# Patient Record
Sex: FEMALE | Race: WHITE | URBAN - METROPOLITAN AREA
[De-identification: names, ages, dates, MRNs, and addresses within clinical notes are randomized per-mention and may not be internally consistent; named-entity substitution may affect disease eponyms.]

---

## 2023-11-09 ENCOUNTER — OFFICE VISIT (OUTPATIENT)
Age: 53
End: 2023-11-09

## 2023-11-09 VITALS
HEART RATE: 81 BPM | DIASTOLIC BLOOD PRESSURE: 79 MMHG | BODY MASS INDEX: 31.65 KG/M2 | SYSTOLIC BLOOD PRESSURE: 120 MMHG | TEMPERATURE: 98.5 F | HEIGHT: 65 IN | RESPIRATION RATE: 17 BRPM | WEIGHT: 190 LBS | OXYGEN SATURATION: 95 %

## 2023-11-09 DIAGNOSIS — R50.9 FEVER, UNSPECIFIED FEVER CAUSE: Primary | ICD-10-CM

## 2023-11-09 DIAGNOSIS — U07.1 COVID-19: ICD-10-CM

## 2023-11-09 LAB
Lab: ABNORMAL
QC PASS/FAIL: ABNORMAL
SARS-COV-2 RDRP RESP QL NAA+PROBE: POSITIVE

## 2023-11-09 RX ORDER — METHYLPREDNISOLONE 4 MG/1
TABLET ORAL
Qty: 21 TABLET | Refills: 0 | Status: SHIPPED | OUTPATIENT
Start: 2023-11-09 | End: 2023-11-15

## 2023-11-09 RX ORDER — AZITHROMYCIN 250 MG/1
250 TABLET, FILM COATED ORAL SEE ADMIN INSTRUCTIONS
Qty: 6 TABLET | Refills: 0 | Status: SHIPPED | OUTPATIENT
Start: 2023-11-09 | End: 2023-11-14

## 2024-02-16 ENCOUNTER — OFFICE VISIT (OUTPATIENT)
Age: 54
End: 2024-02-16

## 2024-02-16 VITALS
DIASTOLIC BLOOD PRESSURE: 80 MMHG | OXYGEN SATURATION: 94 % | RESPIRATION RATE: 16 BRPM | HEART RATE: 98 BPM | HEIGHT: 65 IN | TEMPERATURE: 97.7 F | SYSTOLIC BLOOD PRESSURE: 123 MMHG | WEIGHT: 211 LBS | BODY MASS INDEX: 35.16 KG/M2

## 2024-02-16 DIAGNOSIS — J20.9 ACUTE BRONCHITIS, UNSPECIFIED ORGANISM: Primary | ICD-10-CM

## 2024-02-16 RX ORDER — ALBUTEROL SULFATE 90 UG/1
2 AEROSOL, METERED RESPIRATORY (INHALATION) 4 TIMES DAILY PRN
Qty: 18 G | Refills: 0 | Status: SHIPPED | OUTPATIENT
Start: 2024-02-16

## 2024-02-16 RX ORDER — DEXTROMETHORPHAN HYDROBROMIDE AND PROMETHAZINE HYDROCHLORIDE 15; 6.25 MG/5ML; MG/5ML
5 SYRUP ORAL 4 TIMES DAILY PRN
Qty: 120 ML | Refills: 0 | Status: SHIPPED | OUTPATIENT
Start: 2024-02-16 | End: 2024-02-23

## 2024-02-16 RX ORDER — AZITHROMYCIN 250 MG/1
250 TABLET, FILM COATED ORAL SEE ADMIN INSTRUCTIONS
Qty: 6 TABLET | Refills: 0 | Status: SHIPPED | OUTPATIENT
Start: 2024-02-16 | End: 2024-02-21

## 2024-02-16 RX ORDER — PREDNISONE 20 MG/1
20 TABLET ORAL 2 TIMES DAILY
Qty: 10 TABLET | Refills: 0 | Status: SHIPPED | OUTPATIENT
Start: 2024-02-16 | End: 2024-02-21

## 2024-02-16 NOTE — PROGRESS NOTES
Catalina SWANN [facility] (:  1970) is a 53 y.o. female,Established patient, here for evaluation of the following chief complaint(s):  Cough (Cough / wheezing / headache / 4 days )      ASSESSMENT/PLAN:  1. Acute bronchitis, unspecified organism    - azithromycin (ZITHROMAX) 250 MG tablet; Take 1 tablet by mouth See Admin Instructions for 5 days 500mg on day 1 followed by 250mg on days 2 - 5  Dispense: 6 tablet; Refill: 0  - predniSONE (DELTASONE) 20 MG tablet; Take 1 tablet by mouth 2 times daily for 5 days  Dispense: 10 tablet; Refill: 0  - promethazine-dextromethorphan (PROMETHAZINE-DM) 6.25-15 MG/5ML syrup; Take 5 mLs by mouth 4 times daily as needed for Cough  Dispense: 120 mL; Refill: 0  - albuterol sulfate HFA (VENTOLIN HFA) 108 (90 Base) MCG/ACT inhaler; Inhale 2 puffs into the lungs 4 times daily as needed for Wheezing  Dispense: 18 g; Refill: 0     -stop smoking,increase fluid intake,to the ER if worsening symptoms  No follow-ups on file.    SUBJECTIVE/OBJECTIVE:    History provided by:  Patient  Cough  The current episode started in the past 7 days. The problem has been gradually worsening. The cough is Productive of sputum. Associated symptoms include nasal congestion and wheezing. Pertinent negatives include no chest pain, chills, ear pain, fever, headaches, myalgias, rash, rhinorrhea, sore throat, shortness of breath or sweats.       Vitals:    24 1611   BP: 123/80   Site: Right Upper Arm   Position: Sitting   Cuff Size: Large Adult   Pulse: 98   Resp: 16   Temp: 97.7 °F (36.5 °C)   TempSrc: Oral   SpO2: 94%   Weight: 95.7 kg (211 lb)   Height: 1.651 m (5' 5\")       Review of Systems   Constitutional:  Negative for chills and fever.   HENT:  Negative for ear pain, rhinorrhea and sore throat.    Respiratory:  Positive for cough and wheezing. Negative for shortness of breath.    Cardiovascular:  Negative for chest pain.   Musculoskeletal:  Negative for myalgias.   Skin:  Negative for rash.

## 2024-08-06 ENCOUNTER — OFFICE VISIT (OUTPATIENT)
Age: 54
End: 2024-08-06

## 2024-08-06 VITALS
HEART RATE: 98 BPM | TEMPERATURE: 98.6 F | HEIGHT: 65 IN | SYSTOLIC BLOOD PRESSURE: 119 MMHG | BODY MASS INDEX: 36.65 KG/M2 | DIASTOLIC BLOOD PRESSURE: 78 MMHG | OXYGEN SATURATION: 96 % | WEIGHT: 220 LBS

## 2024-08-06 DIAGNOSIS — J01.10 ACUTE NON-RECURRENT FRONTAL SINUSITIS: Primary | ICD-10-CM

## 2024-08-06 DIAGNOSIS — J02.9 SORE THROAT: ICD-10-CM

## 2024-08-06 DIAGNOSIS — Z91.89 AT INCREASED RISK OF EXPOSURE TO COVID-19 VIRUS: ICD-10-CM

## 2024-08-06 LAB
Lab: NORMAL
PERFORMING INSTRUMENT: NORMAL
QC PASS/FAIL: NORMAL
SARS-COV-2, POC: NORMAL
STREPTOCOCCUS A RNA: NEGATIVE

## 2024-08-06 RX ORDER — AMOXICILLIN AND CLAVULANATE POTASSIUM 875; 125 MG/1; MG/1
1 TABLET, FILM COATED ORAL 2 TIMES DAILY
Qty: 14 TABLET | Refills: 0 | Status: SHIPPED | OUTPATIENT
Start: 2024-08-06 | End: 2024-08-06

## 2024-08-06 RX ORDER — PREDNISONE 20 MG/1
20 TABLET ORAL 2 TIMES DAILY
Qty: 10 TABLET | Refills: 0 | Status: SHIPPED | OUTPATIENT
Start: 2024-08-06 | End: 2024-08-11

## 2024-08-06 RX ORDER — AZITHROMYCIN 500 MG/1
500 TABLET, FILM COATED ORAL DAILY
Qty: 5 TABLET | Refills: 0 | Status: SHIPPED | OUTPATIENT
Start: 2024-08-06 | End: 2024-08-11

## 2024-08-06 ASSESSMENT — ENCOUNTER SYMPTOMS
TROUBLE SWALLOWING: 0
EYE DISCHARGE: 0
CHEST TIGHTNESS: 0
NAUSEA: 1
SHORTNESS OF BREATH: 0
VOMITING: 0
SINUS PRESSURE: 1
COUGH: 1
RHINORRHEA: 1
WHEEZING: 0
DIARRHEA: 0
SINUS PAIN: 1
SORE THROAT: 1

## 2024-08-06 NOTE — PATIENT INSTRUCTIONS
Take medications as prescribed    Increase hydration and rest    Saline spray or Flonase nasal spray to help relief nasal congestion    Sleep with head of bed elevated to help you breathe better    Avoid exposure to cigarette smoke or fumes    Avoid caffeine and alcohol    May use ibuprofen for headache, fever or discomfort

## 2024-08-06 NOTE — PROGRESS NOTES
1832 pm: Pt called clinic that Augmentin that she was put on today is making her nausea requesting alternative treatment.  Sent azithromycin to pharmacy on file. Unable to reach pt to make aware of changes. Home number incorrect. Cell phone number on chart not accepting messages. Pt needs to stop Augmentin and start azithromycin.       [facility] URGENT CAR[facility] ([place])  [facility] URGENT CARE  [address] [address]  Dept: [phone]  Dept Fax: [phone]  Loc: [phone]  Catalina Holland is a 54 y.o. female who presents today for her medical conditions/complaintsas noted below.  Catalina Holland is c/o of Pharyngitis (Sore throat, body aches, ear pain x1 day)      HPI:     HPI    Past Medical History:   Diagnosis Date    Anxiety     Depression       Past Surgical History:   Procedure Laterality Date    GALLBLADDER SURGERY      HYSTERECTOMY (CERVIX STATUS UNKNOWN)      TONSILLECTOMY      ADENOIDECTOMY       History reviewed. No pertinent family history.    Social History     Tobacco Use    Smoking status: Every Day     Current packs/day: 1.00     Types: Cigarettes    Smokeless tobacco: Not on file   Substance Use Topics    Alcohol use: No     Comment: SOCIAL      Current Outpatient Medications   Medication Sig Dispense Refill    predniSONE (DELTASONE) 20 MG tablet Take 1 tablet by mouth 2 times daily for 5 days 10 tablet 0    Pseudoephedrine-DM-GG 60- MG TABS Take 1 tablet by mouth every 6-8 hours as needed (Congestion/Sinusitis/cough) 30 tablet 0    azithromycin (ZITHROMAX) 500 MG tablet Take 1 tablet by mouth daily for 5 days 5 tablet 0    albuterol sulfate HFA (VENTOLIN HFA) 108 (90 Base) MCG/ACT inhaler Inhale 2 puffs into the lungs 4 times daily as needed for Wheezing 18 g 0    guaiFENesin (Q-TUSSIN) 100 MG/5ML syrup Take 200 mg by mouth every 8 hours as needed for Cough. (Patient not taking: Reported on 11/9/2023)      albuterol (PROAIR HFA) 108 (90 BASE) MCG/ACT inhaler 1-2

## 2024-08-06 NOTE — PROGRESS NOTES
[facility] URGENT CARE, [facility] ([place])  [facility] URGENT CARE  [address] [address]  Dept: [phone]  Dept Fax: [phone]  Loc: [phone]  Catalina Holland is a 54 y.o. female who presents today for her medical conditions/complaintsas noted below.  Catalina Holland is c/o of Pharyngitis (Sore throat, body aches, ear pain x1 day)      HPI:     HPI    Past Medical History:   Diagnosis Date    Anxiety     Depression       Past Surgical History:   Procedure Laterality Date    GALLBLADDER SURGERY      HYSTERECTOMY (CERVIX STATUS UNKNOWN)      TONSILLECTOMY      ADENOIDECTOMY       History reviewed. No pertinent family history.    Social History     Tobacco Use    Smoking status: Every Day     Current packs/day: 1.00     Types: Cigarettes    Smokeless tobacco: Not on file   Substance Use Topics    Alcohol use: No     Comment: SOCIAL      Current Outpatient Medications   Medication Sig Dispense Refill    predniSONE (DELTASONE) 20 MG tablet Take 1 tablet by mouth 2 times daily for 5 days 10 tablet 0    Pseudoephedrine-DM-GG 60- MG TABS Take 1 tablet by mouth every 6-8 hours as needed (Congestion/Sinusitis/cough) 30 tablet 0    azithromycin (ZITHROMAX) 500 MG tablet Take 1 tablet by mouth daily for 5 days 5 tablet 0    albuterol sulfate HFA (VENTOLIN HFA) 108 (90 Base) MCG/ACT inhaler Inhale 2 puffs into the lungs 4 times daily as needed for Wheezing 18 g 0    guaiFENesin (Q-TUSSIN) 100 MG/5ML syrup Take 200 mg by mouth every 8 hours as needed for Cough. (Patient not taking: Reported on 11/9/2023)      albuterol (PROAIR HFA) 108 (90 BASE) MCG/ACT inhaler 1-2 inhalations every 6 hours as needed for wheezing  Dispense with SPACER and Instruct on use (Patient not taking: Reported on 2/16/2024) 1 Inhaler 0     No current facility-administered medications for this visit.     Allergies   Allergen Reactions    Sulfa Antibiotics Shortness Of Breath    Chantix [Varenicline] Hallucinations    Hm

## 2024-08-06 NOTE — PROGRESS NOTES
[facility] URGENT CARE, [facility] ([place])  [facility] URGENT CARE  [address] [address]  Dept: [phone]  Dept Fax: [phone]  Loc: [phone]  Catalina Holland is a 54 y.o. female who presents today for her medical conditions/complaintsas noted below.  Catalina Holland is c/o of Pharyngitis (Sore throat, body aches, ear pain x1 day)      HPI:       Pharyngitis  Severity:  Moderate  Onset quality:  Sudden  Duration:  1 day  Timing:  Constant  Progression:  Worsening  Chronicity:  New  Relieved by:  Nothing  Worsened by:  Swallowing  Ineffective treatments:  Ibuprofen  Associated symptoms: congestion, cough, fatigue, headaches, myalgias, nausea, rhinorrhea and sore throat    Associated symptoms: no chest pain, no diarrhea, no fever, no loss of consciousness, no rash, no shortness of breath, no vomiting and no wheezing    Congestion:     Location:  Nasal    Interferes with sleep: no      Interferes with eating/drinking: no    Cough:     Cough characteristics:  Non-productive    Severity:  Mild    Onset quality:  Sudden    Duration:  1 day    Progression:  Worsening  Headaches:     Severity:  Moderate    Onset quality:  Gradual    Duration:  1 day    Progression:  Waxing and waning    Chronicity:  New  Nausea:     Severity:  Mild    Onset quality:  Gradual    Duration:  1 day    Progression:  Partially resolved      Past Medical History:   Diagnosis Date    Anxiety     Depression       Past Surgical History:   Procedure Laterality Date    GALLBLADDER SURGERY      HYSTERECTOMY (CERVIX STATUS UNKNOWN)      TONSILLECTOMY      ADENOIDECTOMY       History reviewed. No pertinent family history.    Social History     Tobacco Use    Smoking status: Every Day     Current packs/day: 1.00     Types: Cigarettes    Smokeless tobacco: Not on file   Substance Use Topics    Alcohol use: No     Comment: SOCIAL      Current Outpatient Medications   Medication Sig Dispense Refill    amoxicillin-clavulanate

## 2024-08-22 ENCOUNTER — TELEPHONE (OUTPATIENT)
Age: 54
End: 2024-08-22

## 2024-08-22 ENCOUNTER — OFFICE VISIT (OUTPATIENT)
Age: 54
End: 2024-08-22

## 2024-08-22 ENCOUNTER — HOSPITAL ENCOUNTER (OUTPATIENT)
Age: 54
Discharge: HOME | End: 2024-08-22
Payer: COMMERCIAL

## 2024-08-22 ENCOUNTER — HOSPITAL ENCOUNTER (OUTPATIENT)
Dept: GENERAL RADIOLOGY | Age: 54
Discharge: HOME | End: 2024-08-22
Payer: COMMERCIAL

## 2024-08-22 VITALS
OXYGEN SATURATION: 94 % | BODY MASS INDEX: 36.44 KG/M2 | HEART RATE: 99 BPM | TEMPERATURE: 98.2 F | DIASTOLIC BLOOD PRESSURE: 74 MMHG | SYSTOLIC BLOOD PRESSURE: 114 MMHG | WEIGHT: 219 LBS

## 2024-08-22 DIAGNOSIS — J18.9 SYMPTOMS OF PNEUMONIA: Primary | ICD-10-CM

## 2024-08-22 DIAGNOSIS — J18.9 SYMPTOMS OF PNEUMONIA: ICD-10-CM

## 2024-08-22 PROCEDURE — 71046 X-RAY EXAM CHEST 2 VIEWS: CPT

## 2024-08-22 RX ORDER — LEVOFLOXACIN 750 MG/1
750 TABLET, FILM COATED ORAL DAILY
Qty: 7 TABLET | Refills: 0 | Status: SHIPPED | OUTPATIENT
Start: 2024-08-22 | End: 2024-08-29

## 2024-08-22 RX ORDER — BROMPHENIRAMINE MALEATE, PSEUDOEPHEDRINE HYDROCHLORIDE, AND DEXTROMETHORPHAN HYDROBROMIDE 2; 30; 10 MG/5ML; MG/5ML; MG/5ML
10 SYRUP ORAL 3 TIMES DAILY PRN
Qty: 240 ML | Refills: 0 | Status: SHIPPED | OUTPATIENT
Start: 2024-08-22

## 2024-08-22 ASSESSMENT — ENCOUNTER SYMPTOMS
RHINORRHEA: 0
VOMITING: 0
SHORTNESS OF BREATH: 1
COUGH: 1
DIARRHEA: 0
NAUSEA: 0
WHEEZING: 0
SORE THROAT: 0
SINUS PAIN: 0
CHEST TIGHTNESS: 0
TROUBLE SWALLOWING: 0
SINUS PRESSURE: 0
EYE DISCHARGE: 0

## 2024-08-22 NOTE — TELEPHONE ENCOUNTER
Patient made aware of chest X-ray resulting (showing no pneumonia) Asked to continue treatment as was discussed in  clinic. Verbalized understanding.

## 2024-08-22 NOTE — PROGRESS NOTES
[facility] URGENT CARE, [facility] ([place])  [facility] URGENT CARE  [address] [address]  Dept: [phone]  Dept Fax: [phone]  Loc: [phone]  Catalina Holland is a 54 y.o. female who presents today for her medical conditions/complaintsas noted below.  Catalina Holland is c/o of Congestion and Cough      HPI:       History provided by:  Patient  Cough  This is a new problem. The current episode started 1 to 4 weeks ago. The problem has been gradually worsening. The cough is Productive of purulent sputum. Associated symptoms include chills and shortness of breath. Pertinent negatives include no chest pain, fever, headaches, myalgias, nasal congestion, postnasal drip, rash, rhinorrhea, sore throat or wheezing. The symptoms are aggravated by lying down. Treatments tried: Z-pack, capmist, steroid, and albuterol inhaler. The treatment provided mild relief. Her past medical history is significant for bronchitis and environmental allergies.       Past Medical History:   Diagnosis Date    Anxiety     Depression       Past Surgical History:   Procedure Laterality Date    GALLBLADDER SURGERY      HYSTERECTOMY (CERVIX STATUS UNKNOWN)      TONSILLECTOMY      ADENOIDECTOMY       History reviewed. No pertinent family history.    Social History     Tobacco Use    Smoking status: Every Day     Current packs/day: 1.00     Types: Cigarettes    Smokeless tobacco: Not on file   Substance Use Topics    Alcohol use: No     Comment: SOCIAL      Current Outpatient Medications   Medication Sig Dispense Refill    levoFLOXacin (LEVAQUIN) 750 MG tablet Take 1 tablet by mouth daily for 7 days 7 tablet 0    brompheniramine-pseudoephedrine-DM 2-30-10 MG/5ML syrup Take 10 mLs by mouth 3 times daily as needed for Congestion or Cough 240 mL 0    albuterol sulfate HFA (VENTOLIN HFA) 108 (90 Base) MCG/ACT inhaler Inhale 2 puffs into the lungs 4 times daily as needed for Wheezing 18 g 0    Pseudoephedrine-DM-GG 60- MG

## 2024-08-22 NOTE — PATIENT INSTRUCTIONS
Take medications as directed    Get chest X-ray as directed    We will call you with the X-ray results as soon as possible    Do not take any over the counter cough or cold medicine while taking bromfed    Bromfed may increase your blood pressure, do not take if your blood pressure is over 150/100     Return to clinic or go to the ER if symptoms does not improve or worsen    Follow up with your primary care provider

## 2024-09-16 ENCOUNTER — OFFICE VISIT (OUTPATIENT)
Age: 54
End: 2024-09-16

## 2024-09-16 VITALS
SYSTOLIC BLOOD PRESSURE: 120 MMHG | HEART RATE: 96 BPM | WEIGHT: 223 LBS | BODY MASS INDEX: 37.15 KG/M2 | RESPIRATION RATE: 18 BRPM | OXYGEN SATURATION: 95 % | DIASTOLIC BLOOD PRESSURE: 76 MMHG | HEIGHT: 65 IN | TEMPERATURE: 98.2 F

## 2024-09-16 DIAGNOSIS — Z91.89 AT INCREASED RISK OF EXPOSURE TO COVID-19 VIRUS: Primary | ICD-10-CM

## 2024-09-16 ASSESSMENT — ENCOUNTER SYMPTOMS
SHORTNESS OF BREATH: 0
NAUSEA: 0
CHEST TIGHTNESS: 0
WHEEZING: 0
SINUS PAIN: 0
SORE THROAT: 0
TROUBLE SWALLOWING: 0
COUGH: 1
DIARRHEA: 0
EYE DISCHARGE: 0
SINUS PRESSURE: 0
VOMITING: 0
RHINORRHEA: 1

## 2024-11-24 ENCOUNTER — OFFICE VISIT (OUTPATIENT)
Age: 54
End: 2024-11-24

## 2024-11-24 VITALS
WEIGHT: 225 LBS | HEIGHT: 65 IN | DIASTOLIC BLOOD PRESSURE: 83 MMHG | TEMPERATURE: 97.5 F | SYSTOLIC BLOOD PRESSURE: 131 MMHG | HEART RATE: 55 BPM | BODY MASS INDEX: 37.49 KG/M2 | OXYGEN SATURATION: 98 %

## 2024-11-24 DIAGNOSIS — J40 BRONCHITIS: Primary | ICD-10-CM

## 2024-11-24 DIAGNOSIS — Z72.0 CURRENT NICOTINE USE: ICD-10-CM

## 2024-11-24 RX ORDER — ALBUTEROL SULFATE 90 UG/1
2 INHALANT RESPIRATORY (INHALATION) 4 TIMES DAILY PRN
Qty: 18 G | Refills: 0 | Status: SHIPPED | OUTPATIENT
Start: 2024-11-24

## 2024-11-24 RX ORDER — METHYLPREDNISOLONE 4 MG/1
TABLET ORAL
Qty: 1 KIT | Refills: 0 | Status: SHIPPED | OUTPATIENT
Start: 2024-11-24

## 2024-11-24 RX ORDER — AZITHROMYCIN 250 MG/1
TABLET, FILM COATED ORAL
Qty: 6 TABLET | Refills: 0 | Status: SHIPPED | OUTPATIENT
Start: 2024-11-24 | End: 2024-12-04

## 2025-07-14 ENCOUNTER — OFFICE VISIT (OUTPATIENT)
Age: 55
End: 2025-07-14

## 2025-07-14 VITALS
DIASTOLIC BLOOD PRESSURE: 80 MMHG | BODY MASS INDEX: 39.22 KG/M2 | WEIGHT: 235.4 LBS | TEMPERATURE: 97.8 F | HEIGHT: 65 IN | HEART RATE: 90 BPM | OXYGEN SATURATION: 96 % | SYSTOLIC BLOOD PRESSURE: 122 MMHG

## 2025-07-14 DIAGNOSIS — L73.9 FOLLICULITIS: Primary | ICD-10-CM

## 2025-07-14 DIAGNOSIS — L03.119 CELLULITIS OF LOWER EXTREMITY, UNSPECIFIED LATERALITY: ICD-10-CM

## 2025-07-14 RX ORDER — MUPIROCIN 2 %
OINTMENT (GRAM) TOPICAL
Qty: 30 G | Refills: 0 | Status: SHIPPED | OUTPATIENT
Start: 2025-07-14 | End: 2025-07-21

## 2025-07-14 RX ORDER — DOXYCYCLINE HYCLATE 100 MG
100 TABLET ORAL 2 TIMES DAILY
Qty: 14 TABLET | Refills: 0 | Status: SHIPPED | OUTPATIENT
Start: 2025-07-14 | End: 2025-07-21

## 2025-07-14 NOTE — PATIENT INSTRUCTIONS
Prescribed doxycyline 1 tablet twice daily for 7 days and Mupirocin ointment to open sores. Take medication until completed.     Wash daily with soap and water.     Encourage a daily antihistamine such as zyrtec.     Follow up with Dermatologist in 7-10 days

## 2025-07-14 NOTE — PROGRESS NOTES
Catalina SWANN [facility] (:  1970) is a 55 y.o. female,Established patient, here for evaluation of the following chief complaint(s):  Skin Problem (C/o skin rash on arms and legs x 2  months )      Assessment & Plan :  Visit Diagnoses and Associated Orders         Folliculitis    -  Primary    doxycycline hyclate (VIBRA-TABS) 100 MG tablet [2625]      mupirocin (BACTROBAN) 2 % ointment [90746]             Cellulitis of lower extremity, unspecified laterality                     Patient was seen and evaluated for above symptoms. Evaluation of rash appear more folliculitis in nature as rash is small pustules. Prescribed doxycyline 1 tablet twice daily for 7 days and Mupirocin ointment to open sores. Instructed to Take medication until completed. Wash daily with soap and water. Encourage a daily antihistamine such as zyrtec. Instructed to Follow up with Dermatologist in 7-10 days. Patient verbalized and agreed to plan of care.        Subjective :    Skin Problem         55 y.o. female presents with symptoms of rash and redness. Reports, onset of symptoms 2 months ago with a rash to bilateral arms and legs. Reports, she took steroids for 25 days straight with antihistamine without any relief. Mentions she did have a punch biopsy completed on a area on her leg with results of eczema. Report, she has a couple area to her legs that open and now are warm, red, and painful.          Vitals:    25 1030 25 1045   BP: 123/81 122/80   BP Site: Left Upper Arm    Patient Position: Sitting    BP Cuff Size: Large Adult    Pulse: 90    Temp: 97.8 °F (36.6 °C)    TempSrc: Oral    SpO2: 96%    Weight: 106.8 kg (235 lb 6.4 oz)    Height: 1.651 m (5' 5\")        No results found for this visit on 25.      Objective   Physical Exam  Vitals and nursing note reviewed.   Constitutional:       General: She is not in acute distress.     Appearance: Normal appearance.   HENT:      Head: Normocephalic and atraumatic.      Right

## 2025-08-11 ENCOUNTER — OFFICE VISIT (OUTPATIENT)
Age: 55
End: 2025-08-11

## 2025-08-11 VITALS
SYSTOLIC BLOOD PRESSURE: 138 MMHG | HEART RATE: 97 BPM | OXYGEN SATURATION: 93 % | HEIGHT: 65 IN | TEMPERATURE: 97.1 F | DIASTOLIC BLOOD PRESSURE: 83 MMHG | WEIGHT: 234.8 LBS | BODY MASS INDEX: 39.12 KG/M2

## 2025-08-11 DIAGNOSIS — J40 BRONCHITIS: Primary | ICD-10-CM

## 2025-08-11 RX ORDER — AMOXICILLIN 500 MG/1
500 CAPSULE ORAL 2 TIMES DAILY
Qty: 14 CAPSULE | Refills: 0 | Status: SHIPPED | OUTPATIENT
Start: 2025-08-11 | End: 2025-08-18

## 2025-08-11 ASSESSMENT — ENCOUNTER SYMPTOMS: COUGH: 1
